# Patient Record
Sex: FEMALE | Race: BLACK OR AFRICAN AMERICAN | NOT HISPANIC OR LATINO | Employment: OTHER | URBAN - METROPOLITAN AREA
[De-identification: names, ages, dates, MRNs, and addresses within clinical notes are randomized per-mention and may not be internally consistent; named-entity substitution may affect disease eponyms.]

---

## 2019-05-09 ENCOUNTER — HISTORICAL (OUTPATIENT)
Dept: ADMINISTRATIVE | Facility: HOSPITAL | Age: 74
End: 2019-05-09

## 2022-04-30 NOTE — OP NOTE
Patient:   Stephania Bynum            MRN: [id number]            FIN: [id number]               Age:   74 years     Sex:  Female     :  1945   Associated Diagnoses:   None   Author:   Quinn Pichardo II, MD      Pre-op Dx:  Cataract of the Left eye    Post-op Dx:  Cataract of the Left eye     Procedure:  Cataract extraction by phacoemulsification   with an IOL    Anes:   Topical    Complications: None    Procedure in detail:   Dilating drops were given in the holding area.  The patient was brought into the surgical suite, identified and the correct eye confirmed.  Topical anesthesia was applied.  The eye was then prepped and draped in a sterile fashion.  A supersharp blade was used to make a paracentesis at the 5 oclock position.  Viscoelastic was placed in the anterior chamber.  A clear corneal incision was made at the   3 oclock position with a keratome blade.  Next, a cystatome and utrata forceps were used to make and remove a 360 degree capsulorrhexis.  The phaco handpiece was placed into the anterior chamber and the lens removed in a divide and conquer fashion.  The remaining cortex was removed with the I & A handpiece.  Viscoelastic was placed into the capsular bag, which remained clear and intact.  An  IOL was placed in the bag and rotated into position.  The remaining viscoelastic was removed with the I &A handpiece.  The incision was hydrated with BSS and checked for leakage.  No leakage was found.  The drapes were removed and antibiotic drops were placed into the eye.  The patient tolerated the procedure well and was moved back to the holding room.  Sunglasses and instructions were personally given to the patient and family.  The patient will follow-up at my office tomorrow.          20.0   ZCBOO IOL        Rod Pichardo II, M.D.

## 2023-07-08 ENCOUNTER — HOSPITAL ENCOUNTER (EMERGENCY)
Facility: HOSPITAL | Age: 78
Discharge: HOME | End: 2023-07-08
Attending: INTERNAL MEDICINE
Payer: MEDICARE

## 2023-07-08 VITALS
DIASTOLIC BLOOD PRESSURE: 101 MMHG | TEMPERATURE: 98 F | HEIGHT: 65 IN | SYSTOLIC BLOOD PRESSURE: 172 MMHG | WEIGHT: 127.88 LBS | BODY MASS INDEX: 21.31 KG/M2 | HEART RATE: 78 BPM | RESPIRATION RATE: 18 BRPM | OXYGEN SATURATION: 99 %

## 2023-07-08 DIAGNOSIS — S01.81XA LACERATION OF FOREHEAD, INITIAL ENCOUNTER: Primary | ICD-10-CM

## 2023-07-08 PROCEDURE — 12011 RPR F/E/E/N/L/M 2.5 CM/<: CPT

## 2023-07-08 PROCEDURE — 63600175 PHARM REV CODE 636 W HCPCS

## 2023-07-08 PROCEDURE — 25000003 PHARM REV CODE 250

## 2023-07-08 PROCEDURE — 99284 EMERGENCY DEPT VISIT MOD MDM: CPT

## 2023-07-08 PROCEDURE — 90471 IMMUNIZATION ADMIN: CPT

## 2023-07-08 PROCEDURE — 90715 TDAP VACCINE 7 YRS/> IM: CPT

## 2023-07-08 RX ORDER — LIDOCAINE HYDROCHLORIDE 10 MG/ML
10 INJECTION INFILTRATION; PERINEURAL
Status: COMPLETED | OUTPATIENT
Start: 2023-07-08 | End: 2023-07-08

## 2023-07-08 RX ORDER — CEPHALEXIN 500 MG/1
500 CAPSULE ORAL 3 TIMES DAILY
Qty: 15 CAPSULE | Refills: 0 | Status: SHIPPED | OUTPATIENT
Start: 2023-07-08 | End: 2023-07-13

## 2023-07-08 RX ORDER — ACETAMINOPHEN 325 MG/1
650 TABLET ORAL EVERY 6 HOURS PRN
Qty: 30 TABLET | Refills: 0 | Status: SHIPPED | OUTPATIENT
Start: 2023-07-08 | End: 2023-08-07

## 2023-07-08 RX ADMIN — LIDOCAINE HYDROCHLORIDE 10 ML: 10 INJECTION, SOLUTION INFILTRATION; PERINEURAL at 07:07

## 2023-07-08 RX ADMIN — TETANUS TOXOID, REDUCED DIPHTHERIA TOXOID AND ACELLULAR PERTUSSIS VACCINE, ADSORBED 0.5 ML: 5; 2.5; 8; 8; 2.5 SUSPENSION INTRAMUSCULAR at 06:07

## 2023-07-08 NOTE — ED PROVIDER NOTES
Encounter Date: 7/8/2023       History     Chief Complaint   Patient presents with    Head Laceration     Laceration to the forehead after opening a bathroom stall door and it striking her head. Denies LOC, blood thinners, blurred vision, or headache. Bleeding controlled upon arrival with dressing in place.      78 year old female with a PMHx of HTN, brain surgery in 2020 secondary to brain aneurysm presents with laceration to the forehead. Incident took place at [facility] today after patient went to use restroom and tripped causing the bathroom door to hit her head. Denies LOC, headache, vision changes, confusion.     The history is provided by the patient and a relative.   Review of patient's allergies indicates:  No Known Allergies  Past Medical History:   Diagnosis Date    Hypertension      Past Surgical History:   Procedure Laterality Date    BRAIN SURGERY       History reviewed. No pertinent family history.  Social History     Tobacco Use    Smoking status: Never    Smokeless tobacco: Never   Substance Use Topics    Alcohol use: Not Currently     Comment: social    Drug use: Not Currently     Review of Systems   Constitutional:  Negative for activity change and fever.   HENT:  Negative for ear pain, nosebleeds, sinus pain, sore throat and trouble swallowing.    Eyes:  Negative for photophobia, pain, redness and visual disturbance.   Respiratory:  Negative for chest tightness and shortness of breath.    Cardiovascular:  Negative for chest pain and palpitations.   Gastrointestinal:  Negative for nausea and vomiting.   Genitourinary:  Negative for flank pain and pelvic pain.   Musculoskeletal:  Negative for back pain, joint swelling, neck pain and neck stiffness.   Skin:  Negative for rash.   Neurological:  Negative for speech difficulty, weakness, light-headedness and headaches.   Psychiatric/Behavioral:  Negative for confusion and decreased concentration.    All other systems reviewed and are  negative.    Physical Exam     Initial Vitals [07/08/23 1737]   BP Pulse Resp Temp SpO2   (!) 184/116 109 18 98.2 °F (36.8 °C) 99 %      MAP       --         Physical Exam    Nursing note and vitals reviewed.  Constitutional: She appears well-developed and well-nourished. No distress.   HENT:   Head: Normocephalic.   Nose: Nose normal.   2x 2 cm laceration on forehead. See pic in media   Eyes: EOM are normal. Pupils are equal, round, and reactive to light.   Neck: Neck supple. No tracheal deviation present. No JVD present.   Normal range of motion.  Cardiovascular:  Normal rate, regular rhythm and normal heart sounds.     Exam reveals no friction rub.       No murmur heard.  Pulmonary/Chest: Breath sounds normal. No stridor. No respiratory distress. She has no wheezes. She has no rhonchi. She has no rales.   Abdominal: Abdomen is soft. Bowel sounds are normal. She exhibits no distension. There is no abdominal tenderness. There is no rebound.   Musculoskeletal:         General: No tenderness or edema. Normal range of motion.      Cervical back: Normal range of motion and neck supple.     Neurological: She is alert and oriented to person, place, and time. She has normal strength. No cranial nerve deficit or sensory deficit. GCS score is 15. GCS eye subscore is 4. GCS verbal subscore is 5. GCS motor subscore is 6.   Skin: Skin is warm. No rash noted. No erythema.   Psychiatric: Her behavior is normal. Judgment normal.       ED Course   Lac Repair    Date/Time: 7/8/2023 7:33 PM  Performed by: Marshal Vu MD  Authorized by: Vaibhav Oswald MD     Consent:     Consent obtained:  Verbal    Consent given by:  Patient    Risks discussed:  Infection, pain, poor wound healing and vascular damage    Alternatives discussed:  No treatment  Universal protocol:     Patient identity confirmed:  Verbally with patient  Anesthesia:     Anesthesia method:  None  Laceration details:     Location: forehead.     Length (cm):  2    Depth (mm):  2  Pre-procedure details:     Preparation:  Patient was prepped and draped in usual sterile fashion  Exploration:     Limited defect created (wound extended): no      Hemostasis achieved with:  Direct pressure    Wound extent: muscle damage      Contaminated: no    Treatment:     Area cleansed with:  Povidone-iodine    Amount of cleaning:  Standard    Irrigation solution:  Sterile saline    Irrigation method:  Syringe    Visualized foreign bodies/material removed: no      Debridement:  None  Repair type:     Repair type:  Simple  Post-procedure details:     Dressing:  Bulky dressing    Procedure completion:  Tolerated  Labs Reviewed - No data to display       Imaging Results    None          Medications   LIDOcaine HCL 10 mg/ml (1%) injection 10 mL (10 mLs Infiltration Given by Provider 7/8/23 1900)   Tdap (BOOSTRIX) vaccine injection 0.5 mL (0.5 mLs Intramuscular Given 7/8/23 1857)     Medical Decision Making:   Initial Assessment:   78 year old female with forehead laceration secondary to bathroom door striking her head. Pt was stable upon presenting to the ED. No confusion, vomiting, headaches, and signs of neuro deficits.   Differential Diagnosis:   Forehead laceration  Skull fracture      ED Management:  Laceration repaired with sutures. Strict ED precautions given (Advised pt to return to ED if experiencing vomiting, confusion, recurrent headaches, bleeding, redness, drainage of pus, fever ect...)  Other:   I have discussed this case with another health care provider.          Attending Attestation:   Physician Attestation Statement for Resident:  As the supervising MD   Physician Attestation Statement: I have personally seen and examined this patient.   I agree with the above history.  -:   As the supervising MD I agree with the above PE.     As the supervising MD I agree with the above treatment, course, plan, and disposition.   I was personally present during the critical  portions of the procedure(s) performed by the resident and was immediately available in the ED to provide services and assistance as needed during the entire procedure.                             Clinical Impression:   Final diagnoses:  [S01.81XA] Laceration of forehead, initial encounter (Primary)        ED Disposition Condition    Discharge Stable          ED Prescriptions       Medication Sig Dispense Start Date End Date Auth. Provider    cephALEXin (KEFLEX) 500 MG capsule Take 1 capsule (500 mg total) by mouth 3 (three) times daily. for 5 days 15 capsule 7/8/2023 7/13/2023 Marshal Vu MD    acetaminophen (TYLENOL) 325 MG tablet Take 2 tablets (650 mg total) by mouth every 6 (six) hours as needed for Pain. 30 tablet 7/8/2023 8/7/2023 Marshal Vu MD          Follow-up Information       Follow up With Specialties Details Why Contact Info    [facility] - Emergency Dept Emergency Medicine In 7 days For suture removal [address] [facility] [id number] [phone]             Vaibhav Oswald MD  07/08/23 2021

## 2023-07-09 NOTE — ED NOTES
Pt and son given discharge instructions. Pt instructed to follow up with pcp and return to er if any complications. Pt and son stated understanding. Pt laceration sutured closed by residents and bleeding controlled. Wound covered with gauze. Spoke with dr thompson about pt blood pressure. Md stated ok to discharge home.

## 2023-09-03 ENCOUNTER — HOSPITAL ENCOUNTER (EMERGENCY)
Facility: HOSPITAL | Age: 78
Discharge: HOME | End: 2023-09-03
Attending: INTERNAL MEDICINE
Payer: MEDICARE

## 2023-09-03 VITALS
TEMPERATURE: 97 F | DIASTOLIC BLOOD PRESSURE: 75 MMHG | HEIGHT: 65 IN | BODY MASS INDEX: 34.56 KG/M2 | HEART RATE: 88 BPM | SYSTOLIC BLOOD PRESSURE: 120 MMHG | WEIGHT: 207.44 LBS | RESPIRATION RATE: 20 BRPM | OXYGEN SATURATION: 96 %

## 2023-09-03 DIAGNOSIS — E83.42 HYPOMAGNESEMIA: Primary | ICD-10-CM

## 2023-09-03 DIAGNOSIS — R94.31 PROLONGED Q-T INTERVAL ON ECG: ICD-10-CM

## 2023-09-03 DIAGNOSIS — S01.111A EYEBROW LACERATION, RIGHT, INITIAL ENCOUNTER: ICD-10-CM

## 2023-09-03 DIAGNOSIS — S09.90XA TRAUMATIC INJURY OF HEAD, INITIAL ENCOUNTER: ICD-10-CM

## 2023-09-03 DIAGNOSIS — R55 NEAR SYNCOPE: ICD-10-CM

## 2023-09-03 DIAGNOSIS — E87.6 HYPOKALEMIA: ICD-10-CM

## 2023-09-03 LAB
ALBUMIN SERPL-MCNC: 3 G/DL (ref 3.4–4.8)
ALBUMIN/GLOB SERPL: 0.8 RATIO (ref 1.1–2)
ALP SERPL-CCNC: 78 UNIT/L (ref 40–150)
ALT SERPL-CCNC: 6 UNIT/L (ref 0–55)
AST SERPL-CCNC: 18 UNIT/L (ref 5–34)
BASOPHILS # BLD AUTO: 0.02 X10(3)/MCL
BASOPHILS NFR BLD AUTO: 0.5 %
BILIRUB SERPL-MCNC: 0.3 MG/DL
BUN SERPL-MCNC: 8.5 MG/DL (ref 9.8–20.1)
CALCIUM SERPL-MCNC: 9.4 MG/DL (ref 8.4–10.2)
CHLORIDE SERPL-SCNC: 103 MMOL/L (ref 98–107)
CO2 SERPL-SCNC: 26 MMOL/L (ref 23–31)
CREAT SERPL-MCNC: 0.66 MG/DL (ref 0.55–1.02)
EOSINOPHIL # BLD AUTO: 0.05 X10(3)/MCL (ref 0–0.9)
EOSINOPHIL NFR BLD AUTO: 1.3 %
ERYTHROCYTE [DISTWIDTH] IN BLOOD BY AUTOMATED COUNT: 14.2 % (ref 11.5–17)
GFR SERPLBLD CREATININE-BSD FMLA CKD-EPI: >60 MLS/MIN/1.73/M2
GLOBULIN SER-MCNC: 3.8 GM/DL (ref 2.4–3.5)
GLUCOSE SERPL-MCNC: 95 MG/DL (ref 82–115)
HCT VFR BLD AUTO: 37.3 % (ref 37–47)
HGB BLD-MCNC: 12.7 G/DL (ref 12–16)
IMM GRANULOCYTES # BLD AUTO: 0.01 X10(3)/MCL (ref 0–0.04)
IMM GRANULOCYTES NFR BLD AUTO: 0.3 %
LYMPHOCYTES # BLD AUTO: 1.19 X10(3)/MCL (ref 0.6–4.6)
LYMPHOCYTES NFR BLD AUTO: 32 %
MAGNESIUM SERPL-MCNC: 1.7 MG/DL (ref 1.6–2.6)
MCH RBC QN AUTO: 32.6 PG (ref 27–31)
MCHC RBC AUTO-ENTMCNC: 34 G/DL (ref 33–36)
MCV RBC AUTO: 95.6 FL (ref 80–94)
MONOCYTES # BLD AUTO: 0.41 X10(3)/MCL (ref 0.1–1.3)
MONOCYTES NFR BLD AUTO: 11 %
NEUTROPHILS # BLD AUTO: 2.04 X10(3)/MCL (ref 2.1–9.2)
NEUTROPHILS NFR BLD AUTO: 54.9 %
NRBC BLD AUTO-RTO: 0 %
PLATELET # BLD AUTO: 296 X10(3)/MCL (ref 130–400)
PMV BLD AUTO: 10 FL (ref 7.4–10.4)
POTASSIUM SERPL-SCNC: 3.1 MMOL/L (ref 3.5–5.1)
PROT SERPL-MCNC: 6.8 GM/DL (ref 5.8–7.6)
RBC # BLD AUTO: 3.9 X10(6)/MCL (ref 4.2–5.4)
SODIUM SERPL-SCNC: 142 MMOL/L (ref 136–145)
WBC # SPEC AUTO: 3.72 X10(3)/MCL (ref 4.5–11.5)

## 2023-09-03 PROCEDURE — 93005 ELECTROCARDIOGRAM TRACING: CPT | Mod: 59

## 2023-09-03 PROCEDURE — 96365 THER/PROPH/DIAG IV INF INIT: CPT | Mod: 59

## 2023-09-03 PROCEDURE — 12013 RPR F/E/E/N/L/M 2.6-5.0 CM: CPT

## 2023-09-03 PROCEDURE — 63600175 PHARM REV CODE 636 W HCPCS: Performed by: INTERNAL MEDICINE

## 2023-09-03 PROCEDURE — 99284 EMERGENCY DEPT VISIT MOD MDM: CPT | Mod: 25

## 2023-09-03 PROCEDURE — 96368 THER/DIAG CONCURRENT INF: CPT

## 2023-09-03 PROCEDURE — 83735 ASSAY OF MAGNESIUM: CPT | Performed by: INTERNAL MEDICINE

## 2023-09-03 PROCEDURE — 85025 COMPLETE CBC W/AUTO DIFF WBC: CPT | Performed by: INTERNAL MEDICINE

## 2023-09-03 PROCEDURE — 80053 COMPREHEN METABOLIC PANEL: CPT | Performed by: INTERNAL MEDICINE

## 2023-09-03 RX ORDER — POTASSIUM CHLORIDE 7.45 MG/ML
10 INJECTION INTRAVENOUS
Status: COMPLETED | OUTPATIENT
Start: 2023-09-03 | End: 2023-09-03

## 2023-09-03 RX ORDER — POTASSIUM CHLORIDE 750 MG/1
10 TABLET, EXTENDED RELEASE ORAL DAILY
Qty: 10 TABLET | Refills: 0 | Status: SHIPPED | OUTPATIENT
Start: 2023-09-03

## 2023-09-03 RX ORDER — LIDOCAINE HYDROCHLORIDE 10 MG/ML
10 INJECTION INFILTRATION; PERINEURAL
Status: DISCONTINUED | OUTPATIENT
Start: 2023-09-03 | End: 2023-09-03 | Stop reason: HOSPADM

## 2023-09-03 RX ORDER — MAGNESIUM SULFATE HEPTAHYDRATE 40 MG/ML
2 INJECTION, SOLUTION INTRAVENOUS
Status: COMPLETED | OUTPATIENT
Start: 2023-09-03 | End: 2023-09-03

## 2023-09-03 RX ADMIN — POTASSIUM CHLORIDE 10 MEQ: 7.46 INJECTION, SOLUTION INTRAVENOUS at 11:09

## 2023-09-03 RX ADMIN — MAGNESIUM SULFATE HEPTAHYDRATE 2 G: 40 INJECTION, SOLUTION INTRAVENOUS at 11:09

## 2023-09-03 NOTE — ED PROVIDER NOTES
"Encounter Date: 9/3/2023       History     Chief Complaint   Patient presents with    Fall     C/o fall today, near syncopal episode. States dizziness intermittently for one month. 2nd fall in a month. Dizzy when standing up. Laceration to right eyebrow and swelling to right upper lip noted.     Dizziness    Laceration     Presents after a head injury secondary to fall after "dizzy spell".  States got dizzy and fell, laceration to right eyebrow. States getting more frequent dizzy spells.    The history is provided by the patient and a relative.     Review of patient's allergies indicates:  No Known Allergies  Past Medical History:   Diagnosis Date    Hypertension      Past Surgical History:   Procedure Laterality Date    BRAIN SURGERY       History reviewed. No pertinent family history.  Social History     Tobacco Use    Smoking status: Never    Smokeless tobacco: Never   Substance Use Topics    Alcohol use: Not Currently     Comment: social    Drug use: Not Currently     Review of Systems   Constitutional:  Negative for fever.   HENT:  Negative for sore throat.    Respiratory:  Negative for shortness of breath.    Cardiovascular:  Negative for chest pain.   Gastrointestinal:  Negative for nausea.   Genitourinary:  Negative for dysuria.   Musculoskeletal:  Negative for back pain.   Skin:  Positive for wound. Negative for rash.   Neurological:  Positive for dizziness and light-headedness. Negative for weakness.   Hematological:  Does not bruise/bleed easily.   All other systems reviewed and are negative.      Physical Exam     Initial Vitals [09/03/23 1015]   BP Pulse Resp Temp SpO2   125/81 82 20 97.3 °F (36.3 °C) 96 %      MAP       --         Physical Exam    Nursing note and vitals reviewed.  Constitutional: She appears well-developed. No distress.   HENT:   Head: Normocephalic.   Mouth/Throat: Oropharynx is clear and moist.   Right eyebrow 3 cm laceration, no active bleeding. Left side upper lip contusion with " associated swelling   Eyes: Conjunctivae and EOM are normal. Pupils are equal, round, and reactive to light.   Neck: Neck supple. No JVD present.   No midline cervical tenderness   Normal range of motion.  Cardiovascular:  Normal rate, regular rhythm, normal heart sounds and intact distal pulses.           Pulmonary/Chest: Breath sounds normal.   Abdominal: Abdomen is soft. Bowel sounds are normal. She exhibits no distension. There is no abdominal tenderness. There is no rebound and no guarding.   Musculoskeletal:         General: No tenderness or edema. Normal range of motion.      Cervical back: Normal range of motion and neck supple.     Neurological: She is alert and oriented to person, place, and time. She has normal strength. No cranial nerve deficit. GCS score is 15. GCS eye subscore is 4. GCS verbal subscore is 5. GCS motor subscore is 6.   Skin: Skin is warm. No rash noted.   Left side upper lip contusion with associated swelling     Psychiatric: Her behavior is normal.         ED Course   Lac Repair    Date/Time: 9/3/2023 12:04 PM    Performed by: Vaibhav Irizarry MD  Authorized by: Vaibhav Irizarry MD    Consent:     Consent obtained:  Verbal    Consent given by:  Patient    Risks, benefits, and alternatives were discussed: yes      Risks discussed:  Infection, need for additional repair, poor wound healing and poor cosmetic result    Alternatives discussed:  No treatment, delayed treatment and referral  Universal protocol:     Procedure explained and questions answered to patient or proxy's satisfaction: yes      Relevant documents present and verified: yes      Test results available: yes      Imaging studies available: yes      Site/side marked: no      Immediately prior to procedure, a time out was called: yes      Patient identity confirmed:  Verbally with patient  Anesthesia:     Anesthesia method:  Nerve block    Block location:  Supraorbital    Block needle gauge:  25 G     Block anesthetic:  Lidocaine 1% w/o epi    Block technique:  Anatomical landmark palpation    Block injection procedure:  Anatomic landmarks identified    Block outcome:  Anesthesia achieved  Laceration details:     Location:  Face    Face location:  R eyebrow    Length (cm):  3    Depth (mm):  5  Pre-procedure details:     Preparation:  Patient was prepped and draped in usual sterile fashion and imaging obtained to evaluate for foreign bodies  Exploration:     Limited defect created (wound extended): no      Hemostasis achieved with:  Direct pressure    Imaging outcome: foreign body not noted      Wound exploration: wound explored through full range of motion and entire depth of wound visualized      Wound extent: no fascia violation noted, no foreign bodies/material noted and no muscle damage noted      Contaminated: no    Treatment:     Area cleansed with:  Saline    Amount of cleaning:  Standard    Irrigation solution:  Sterile water    Irrigation volume:  250    Irrigation method:  Pressure wash    Visualized foreign bodies/material removed: no      Debridement:  None    Undermining:  None    Scar revision: no    Skin repair:     Repair method:  Sutures    Suture size:  5-0    Suture material:  Prolene    Suture technique:  Simple interrupted    Number of sutures:  4  Approximation:     Approximation:  Close  Post-procedure details:     Dressing:  Open (no dressing)    Procedure completion:  Tolerated well, no immediate complications    Labs Reviewed   COMPREHENSIVE METABOLIC PANEL - Abnormal; Notable for the following components:       Result Value    Potassium Level 3.1 (*)     Blood Urea Nitrogen 8.5 (*)     Albumin Level 3.0 (*)     Globulin 3.8 (*)     Albumin/Globulin Ratio 0.8 (*)     All other components within normal limits   CBC WITH DIFFERENTIAL - Abnormal; Notable for the following components:    WBC 3.72 (*)     RBC 3.90 (*)     MCV 95.6 (*)     MCH 32.6 (*)     Neut # 2.04 (*)     All other  components within normal limits   MAGNESIUM - Normal   CBC W/ AUTO DIFFERENTIAL    Narrative:     The following orders were created for panel order CBC auto differential.  Procedure                               Abnormality         Status                     ---------                               -----------         ------                     CBC with Differential[[id number]]        Abnormal            Final result                 Please view results for these tests on the individual orders.   EXTRA TUBES    Narrative:     The following orders were created for panel order EXTRA TUBES.  Procedure                               Abnormality         Status                     ---------                               -----------         ------                     Lavender Top Hold[[id number]]                                In process                   Please view results for these tests on the individual orders.   LAVENDER TOP HOLD     EKG Readings: (Independently Interpreted)   Initial Reading: No STEMI. Rhythm: Normal Sinus Rhythm. Heart Rate: 82. Ectopy: Rare PACs. Conduction: Normal. ST Segments: Normal ST Segments. T Waves: Normal. Axis: Normal. Other Findings: Prolonged QT Interval. Clinical Impression: Normal Sinus Rhythm Other Impression: QTc 474     ECG Results              EKG 12-lead (In process)  Result time 09/03/23 10:50:21      In process by Interface, Lab [facility] (09/03/23 10:50:21)                   Narrative:    Test Reason : R55,    Vent. Rate : 082 BPM     Atrial Rate : 082 BPM     P-R Int : 174 ms          QRS Dur : 078 ms      QT Int : 406 ms       P-R-T Axes : 088 051 050 degrees     QTc Int : 474 ms    Sinus rhythm with Premature atrial complexes  Otherwise normal ECG  No previous ECGs available    Referred By: [email or web address]   SELF           Confirmed By:                                   Imaging Results              CT Head Without Contrast (Final result)  Result time 09/03/23 11:03:28      Final  result by Vicente Guerrero MD (09/03/23 11:03:28)                   Impression:      No acute intracranial process identified.      Electronically signed by: Vicente Guerrero  Date:    09/03/2023  Time:    11:03               Narrative:    EXAMINATION:  CT HEAD WITHOUT CONTRAST    CLINICAL HISTORY:  Head trauma, minor (Age >= 65y);Facial trauma, blunt;    TECHNIQUE:  CT images of the head without IV contrast. Axial, coronal and sagittal images reviewed. Dose length product 92 mGycm. Automatic exposure control, adjustment of mA/kV or iterative reconstruction technique used to limit radiation dose.    COMPARISON:  No relevant comparison studies available at the time of dictation.    FINDINGS:  Extra-axial spaces/ventricular system: Normal for age.    Intracranial hemorrhage: None identified.    Cerebral parenchyma: No acute large vessel territory infarct or mass effect identified. Mild chronic small vessel ischemic change.    Vascular system: No hyperdense vessel appreciated.    Cerebellum: Normal.    Sella: Empty.    Included paranasal sinuses and mastoid air cells: Small volume fluid in the right maxillary sinus.  Partial opacification of the right sphenoid sinus.    Visualized orbits: Prior bilateral lens surgery.    Scalp/Calvarium: No depressed skull fracture.  Small right frontal scalp hematoma measuring about 3 cm in transverse diameter.  Prior right frontal craniotomy.                                       Medications   LIDOcaine HCL 10 mg/ml (1%) injection 10 mL (has no administration in time range)   magnesium sulfate 2g in water 50mL IVPB (premix) (2 g Intravenous New Bag 9/3/23 1133)   potassium chloride 10 mEq in 100 mL IVPB (10 mEq Intravenous New Bag 9/3/23 1132)     Medical Decision Making  Amount and/or Complexity of Data Reviewed  Independent Historian: caregiver     Details: Son states she needs a walker  Labs: ordered. Decision-making details documented in ED Course.  Radiology: ordered and  independent interpretation performed. Decision-making details documented in ED Course.  ECG/medicine tests: ordered and independent interpretation performed. Decision-making details documented in ED Course.    Risk  Prescription drug management.                               Clinical Impression:   Final diagnoses:  [R55] Near syncope  [E83.42] Hypomagnesemia (Primary)  [E87.6] Hypokalemia  [R94.31] Prolonged Q-T interval on ECG  [S01.111A] Eyebrow laceration, right, initial encounter  [S09.90XA] Traumatic injury of head, initial encounter        ED Disposition Condition    Discharge Stable          ED Prescriptions       Medication Sig Dispense Start Date End Date Auth. Provider    potassium chloride (KLOR-CON) 10 MEQ TbSR Take 1 tablet (10 mEq total) by mouth once daily. 10 tablet 9/3/2023 -- Vaibhav Irizarry MD          Follow-up Information       Follow up With Specialties Details Why Contact Info    [facility] - Emergency Dept Emergency Medicine  If symptoms worsen [address] [place] [phone]    Vaibhav Irizarry MD Internal Medicine In 1 week For suture removal [address] [place] [address] & [facility] [id number]  [phone]      [facility]  Schedule an appointment as soon as possible for a visit in 2 weeks  [address] [place] [phone]             Vaibhav Irizarry MD  09/03/23 1239